# Patient Record
Sex: FEMALE | Race: WHITE | NOT HISPANIC OR LATINO | URBAN - METROPOLITAN AREA
[De-identification: names, ages, dates, MRNs, and addresses within clinical notes are randomized per-mention and may not be internally consistent; named-entity substitution may affect disease eponyms.]

---

## 2018-12-30 ENCOUNTER — EMERGENCY (EMERGENCY)
Facility: HOSPITAL | Age: 53
LOS: 1 days | Discharge: HOME | End: 2018-12-30
Attending: EMERGENCY MEDICINE | Admitting: INTERNAL MEDICINE
Payer: SELF-PAY

## 2018-12-30 VITALS
DIASTOLIC BLOOD PRESSURE: 113 MMHG | OXYGEN SATURATION: 100 % | RESPIRATION RATE: 16 BRPM | HEART RATE: 110 BPM | TEMPERATURE: 98 F | SYSTOLIC BLOOD PRESSURE: 198 MMHG

## 2018-12-30 DIAGNOSIS — I10 ESSENTIAL (PRIMARY) HYPERTENSION: ICD-10-CM

## 2018-12-30 DIAGNOSIS — R07.89 OTHER CHEST PAIN: ICD-10-CM

## 2018-12-30 LAB
ALBUMIN SERPL ELPH-MCNC: 4.4 G/DL — SIGNIFICANT CHANGE UP (ref 3.3–5)
ALP SERPL-CCNC: 77 U/L — SIGNIFICANT CHANGE UP (ref 40–120)
ALT FLD-CCNC: 23 U/L — SIGNIFICANT CHANGE UP (ref 4–33)
AST SERPL-CCNC: 23 U/L — SIGNIFICANT CHANGE UP (ref 4–32)
BASOPHILS # BLD AUTO: 0.06 K/UL — SIGNIFICANT CHANGE UP (ref 0–0.2)
BASOPHILS NFR BLD AUTO: 1 % — SIGNIFICANT CHANGE UP (ref 0–2)
BILIRUB SERPL-MCNC: < 0.2 MG/DL — LOW (ref 0.2–1.2)
BUN SERPL-MCNC: 11 MG/DL — SIGNIFICANT CHANGE UP (ref 7–23)
CALCIUM SERPL-MCNC: 9.3 MG/DL — SIGNIFICANT CHANGE UP (ref 8.4–10.5)
CHLORIDE SERPL-SCNC: 105 MMOL/L — SIGNIFICANT CHANGE UP (ref 98–107)
CK MB BLD-MCNC: 3.93 NG/ML — SIGNIFICANT CHANGE UP (ref 1–4.7)
CK MB BLD-MCNC: SIGNIFICANT CHANGE UP (ref 0–2.5)
CK SERPL-CCNC: 72 U/L — SIGNIFICANT CHANGE UP (ref 25–170)
CO2 SERPL-SCNC: 26 MMOL/L — SIGNIFICANT CHANGE UP (ref 22–31)
CREAT SERPL-MCNC: 0.67 MG/DL — SIGNIFICANT CHANGE UP (ref 0.5–1.3)
EOSINOPHIL # BLD AUTO: 0.21 K/UL — SIGNIFICANT CHANGE UP (ref 0–0.5)
EOSINOPHIL NFR BLD AUTO: 3.4 % — SIGNIFICANT CHANGE UP (ref 0–6)
GLUCOSE SERPL-MCNC: 111 MG/DL — HIGH (ref 70–99)
HCT VFR BLD CALC: 44.8 % — SIGNIFICANT CHANGE UP (ref 34.5–45)
HGB BLD-MCNC: 14.1 G/DL — SIGNIFICANT CHANGE UP (ref 11.5–15.5)
IMM GRANULOCYTES # BLD AUTO: 0.03 # — SIGNIFICANT CHANGE UP
IMM GRANULOCYTES NFR BLD AUTO: 0.5 % — SIGNIFICANT CHANGE UP (ref 0–1.5)
LYMPHOCYTES # BLD AUTO: 2.44 K/UL — SIGNIFICANT CHANGE UP (ref 1–3.3)
LYMPHOCYTES # BLD AUTO: 39.2 % — SIGNIFICANT CHANGE UP (ref 13–44)
MCHC RBC-ENTMCNC: 30.1 PG — SIGNIFICANT CHANGE UP (ref 27–34)
MCHC RBC-ENTMCNC: 31.5 % — LOW (ref 32–36)
MCV RBC AUTO: 95.5 FL — SIGNIFICANT CHANGE UP (ref 80–100)
MONOCYTES # BLD AUTO: 0.41 K/UL — SIGNIFICANT CHANGE UP (ref 0–0.9)
MONOCYTES NFR BLD AUTO: 6.6 % — SIGNIFICANT CHANGE UP (ref 2–14)
NEUTROPHILS # BLD AUTO: 3.08 K/UL — SIGNIFICANT CHANGE UP (ref 1.8–7.4)
NEUTROPHILS NFR BLD AUTO: 49.3 % — SIGNIFICANT CHANGE UP (ref 43–77)
NRBC # FLD: 0 — SIGNIFICANT CHANGE UP
PLATELET # BLD AUTO: 222 K/UL — SIGNIFICANT CHANGE UP (ref 150–400)
PMV BLD: 11 FL — SIGNIFICANT CHANGE UP (ref 7–13)
POTASSIUM SERPL-MCNC: 3.9 MMOL/L — SIGNIFICANT CHANGE UP (ref 3.5–5.3)
POTASSIUM SERPL-SCNC: 3.9 MMOL/L — SIGNIFICANT CHANGE UP (ref 3.5–5.3)
PROT SERPL-MCNC: 6.7 G/DL — SIGNIFICANT CHANGE UP (ref 6–8.3)
RBC # BLD: 4.69 M/UL — SIGNIFICANT CHANGE UP (ref 3.8–5.2)
RBC # FLD: 12.7 % — SIGNIFICANT CHANGE UP (ref 10.3–14.5)
SODIUM SERPL-SCNC: 142 MMOL/L — SIGNIFICANT CHANGE UP (ref 135–145)
TROPONIN T, HIGH SENSITIVITY: 17 NG/L — SIGNIFICANT CHANGE UP (ref ?–14)
TROPONIN T, HIGH SENSITIVITY: 20 NG/L — SIGNIFICANT CHANGE UP (ref ?–14)
TROPONIN T, HIGH SENSITIVITY: 23 NG/L — SIGNIFICANT CHANGE UP (ref ?–14)
WBC # BLD: 6.23 K/UL — SIGNIFICANT CHANGE UP (ref 3.8–10.5)
WBC # FLD AUTO: 6.23 K/UL — SIGNIFICANT CHANGE UP (ref 3.8–10.5)

## 2018-12-30 PROCEDURE — 93010 ELECTROCARDIOGRAM REPORT: CPT

## 2018-12-30 PROCEDURE — 99285 EMERGENCY DEPT VISIT HI MDM: CPT | Mod: 25

## 2018-12-30 RX ORDER — INFLUENZA VIRUS VACCINE 15; 15; 15; 15 UG/.5ML; UG/.5ML; UG/.5ML; UG/.5ML
0.5 SUSPENSION INTRAMUSCULAR ONCE
Qty: 0 | Refills: 0 | Status: DISCONTINUED | OUTPATIENT
Start: 2018-12-30 | End: 2018-12-31

## 2018-12-30 RX ORDER — LISINOPRIL 2.5 MG/1
2.5 TABLET ORAL DAILY
Qty: 0 | Refills: 0 | Status: DISCONTINUED | OUTPATIENT
Start: 2018-12-30 | End: 2018-12-31

## 2018-12-30 RX ORDER — SODIUM CHLORIDE 9 MG/ML
1000 INJECTION, SOLUTION INTRAVENOUS ONCE
Qty: 0 | Refills: 0 | Status: DISCONTINUED | OUTPATIENT
Start: 2018-12-30 | End: 2018-12-30

## 2018-12-30 RX ORDER — ASPIRIN/CALCIUM CARB/MAGNESIUM 324 MG
324 TABLET ORAL ONCE
Qty: 0 | Refills: 0 | Status: COMPLETED | OUTPATIENT
Start: 2018-12-30 | End: 2018-12-30

## 2018-12-30 RX ADMIN — LISINOPRIL 2.5 MILLIGRAM(S): 2.5 TABLET ORAL at 15:06

## 2018-12-30 RX ADMIN — Medication 324 MILLIGRAM(S): at 10:22

## 2018-12-30 NOTE — ED ADULT NURSE NOTE - NSIMPLEMENTINTERV_GEN_ALL_ED
Implemented All Universal Safety Interventions:  [place] to call system. Call bell, personal items and telephone within reach. Instruct patient to call for assistance. Room bathroom lighting operational. Non-slip footwear when patient is off stretcher. Physically safe environment: no spills, clutter or unnecessary equipment. Stretcher in lowest position, wheels locked, appropriate side rails in place.

## 2018-12-30 NOTE — ED ADULT NURSE NOTE - OBJECTIVE STATEMENT
pt a*o x3 c/o chest discomfort upon waking up this am 0230. states it feels like a heaviness. also endorses foot and hand tingling. dry mouth and generalized weakness. no motor defecit, facial droop, slurred speech noted. hx htn, states complaint with meds. right ac 20g placed. lsbs down and sent. md at bedside. nsr on monitor

## 2018-12-30 NOTE — ED PROVIDER NOTE - PHYSICAL EXAMINATION
Gen: NAD, AOx3  Head: NCAT  HEENT: PERRL, oral mucosa moist, normal conjunctiva  Lung: CTAB, no respiratory distress  CV: rrr, no murmurs, Normal perfusion  Abd: soft, NTND, no CVA tenderness  MSK: No edema, no visible deformities  Neuro: No focal neurologic deficits, CN intact, motor and sensation intact, no dysmetria, no pronator drift     Skin: No rash   Psych: normal affect

## 2018-12-30 NOTE — ED PROVIDER NOTE - OBJECTIVE STATEMENT
Patient is 53 y F with PMH htn presenting with episode of chest discomfort, feet/hand tingling, felt "off" and generally weak when getting up to go to the bathroom, feeling resolved on it's own.   on amlodipine once nightly, compliant  From [place], drove here 12/26, plans to return 1/1    PMD: Yesy Cardozo ([place])  ROS: Denies fever, palpitations, chills, recent sickness, HA, vision changes, cough, SOB, abdominal pain, n/v/d/c, dysuria, hematuria, rash, new joint aches, and sick contacts. Patient is 53 y F with PMH htn presenting with episode of chest discomfort, feet/hand tingling, felt "off" and generally weak when getting up to go to the bathroom, feeling resolved on its own.   on amlodipine once nightly, compliant  From [place], drove here 12/26, plans to return 1/1    PMD: Yesy Cardozo ([place])  ROS: Denies fever, palpitations, chills, recent sickness, HA, vision changes, cough, SOB, abdominal pain, n/v/d/c, dysuria, hematuria, rash, new joint aches, and sick contacts.

## 2018-12-30 NOTE — H&P ADULT - PROBLEM SELECTOR PLAN 1
admit to tele  serial EKG and Troponin to r/o ACS  Likely component of anxiety associated with Amlodipine  TTE ordered  Consider stress test to r/o CAD as cause of hypertension and multiple complaints

## 2018-12-30 NOTE — ED ADULT TRIAGE NOTE - CHIEF COMPLAINT QUOTE
Pt c/o chest discomfort "like something is stuck in her chest or putting pressure on her" which woke her from sleep. States HTN but "only takes pill once a day in the evening."

## 2018-12-30 NOTE — H&P ADULT - NSHPSOCIALHISTORY_GEN_ALL_CORE
Lives in [place] with daughter, works as a horse farmer  never smoked  has an occasional glass of wine  no illicit drug use

## 2018-12-30 NOTE — ED PROVIDER NOTE - PROGRESS NOTE DETAILS
trop increased by 6, will admit for acs r/o.  CDU full.  will admit to tele dod.  spoke with tele dod Dr. Hidalgo.  will notify tele pa.

## 2018-12-30 NOTE — H&P ADULT - HISTORY OF PRESENT ILLNESS
53 year old female pmh of HTN who presents with chest discomfort. Patient states she drove up from [place] on 12/26 and visiting her sister. She notes that woke up with chest discomfort described as a pressure/tightness, unable to grade on 1/10 scale, non-radiating, and intermittent in nature associated with palpitations.  She also notes having difficulty catching her breath during these episodes. Also notes general feeling of malaise and anxiety that is associated with use of Amlodipine for blood pressure. These symptoms all started after she was prescribed amlodipine, has never had anxiety prior to that.  Dnies headache, visual change, rash, cough, abd pain, nausea, vomiting, diarrhea, brbpr, melena, dysuria, hematuria, syncope, fall, head trauma.

## 2018-12-30 NOTE — H&P ADULT - NSHPLABSRESULTS_GEN_ALL_CORE
14.1   6.23  )-----------( 222      ( 30 Dec 2018 04:00 )             44.8     12-30    142  |  105  |  11  ----------------------------<  111<H>  3.9   |  26  |  0.67    Ca    9.3      30 Dec 2018 04:00    TPro  6.7  /  Alb  4.4  /  TBili  < 0.2<L>  /  DBili  x   /  AST  23  /  ALT  23  /  AlkPhos  77  12-30    EKG NSR @ 97 c poor r-wave progression  Trop 17 > 23  CXR Clear lungs

## 2018-12-30 NOTE — H&P ADULT - NSHPREVIEWOFSYSTEMS_GEN_ALL_CORE
Dnies headache, visual change, rash, cough, abd pain, nausea, vomiting, diarrhea, brbpr, melena, dysuria, hematuria, syncope, fall, head trauma.

## 2018-12-30 NOTE — ED PROVIDER NOTE - ATTENDING CONTRIBUTION TO CARE
52 y/o F with h/o HTN here with chest tightness.  Pt reports that she woke up around 2am, was walking to the bathroom when she started to feel a "burning" in her eyes, numbness to the roof of her mouth, and tingling to bilateral hands.  Then developed chest tightness and sensation of heart beating fast.  No fever, diaphoresis, SOB, nausea, abd pain, leg pain or swelling.  Pt is a nonsmoker.  No personal or family hx of cardiac disease.  Sxs lasting about 30 min and pt is currently asymptomatic.  Well appearing, sitting comfortably in stretcher, awake and alert, nontoxic.  VSS, hypertensive.  Lungs cta bl.  Cards nl S1/S2, RRR, no MRG.  Pulses full and equal in bilateral upper extremities.  Abd soft ntnd.  No pedal edema or calf tenderness.  No focal neuro deficits.  Plan for ekg, labs incl trop, cxr, reassess.  Consider acs in low risk pt (heart 2) vs metabolic disturbance vs anemia.  If trop neg, plan for outpatient follow-up.

## 2018-12-31 VITALS
SYSTOLIC BLOOD PRESSURE: 118 MMHG | TEMPERATURE: 98 F | OXYGEN SATURATION: 100 % | RESPIRATION RATE: 18 BRPM | DIASTOLIC BLOOD PRESSURE: 63 MMHG | HEART RATE: 81 BPM

## 2018-12-31 LAB
APPEARANCE UR: SIGNIFICANT CHANGE UP
BACTERIA # UR AUTO: NEGATIVE — SIGNIFICANT CHANGE UP
BILIRUB UR-MCNC: NEGATIVE — SIGNIFICANT CHANGE UP
BLOOD UR QL VISUAL: NEGATIVE — SIGNIFICANT CHANGE UP
BUN SERPL-MCNC: 11 MG/DL — SIGNIFICANT CHANGE UP (ref 7–23)
CALCIUM SERPL-MCNC: 9.6 MG/DL — SIGNIFICANT CHANGE UP (ref 8.4–10.5)
CHLORIDE SERPL-SCNC: 106 MMOL/L — SIGNIFICANT CHANGE UP (ref 98–107)
CHOLEST SERPL-MCNC: 170 MG/DL — SIGNIFICANT CHANGE UP (ref 120–199)
CO2 SERPL-SCNC: 22 MMOL/L — SIGNIFICANT CHANGE UP (ref 22–31)
COD CRY URNS QL: SIGNIFICANT CHANGE UP (ref 0–0)
COLOR SPEC: YELLOW — SIGNIFICANT CHANGE UP
CREAT SERPL-MCNC: 0.74 MG/DL — SIGNIFICANT CHANGE UP (ref 0.5–1.3)
GLUCOSE SERPL-MCNC: 95 MG/DL — SIGNIFICANT CHANGE UP (ref 70–99)
GLUCOSE UR-MCNC: NEGATIVE — SIGNIFICANT CHANGE UP
HBA1C BLD-MCNC: 5.2 % — SIGNIFICANT CHANGE UP (ref 4–5.6)
HCG SERPL-ACNC: < 5 MIU/ML — SIGNIFICANT CHANGE UP
HCT VFR BLD CALC: 41.9 % — SIGNIFICANT CHANGE UP (ref 34.5–45)
HDLC SERPL-MCNC: 58 MG/DL — SIGNIFICANT CHANGE UP (ref 45–65)
HGB BLD-MCNC: 13.6 G/DL — SIGNIFICANT CHANGE UP (ref 11.5–15.5)
KETONES UR-MCNC: NEGATIVE — SIGNIFICANT CHANGE UP
LEUKOCYTE ESTERASE UR-ACNC: NEGATIVE — SIGNIFICANT CHANGE UP
LIPID PNL WITH DIRECT LDL SERPL: 107 MG/DL — SIGNIFICANT CHANGE UP
MAGNESIUM SERPL-MCNC: 2 MG/DL — SIGNIFICANT CHANGE UP (ref 1.6–2.6)
MCHC RBC-ENTMCNC: 30.1 PG — SIGNIFICANT CHANGE UP (ref 27–34)
MCHC RBC-ENTMCNC: 32.5 % — SIGNIFICANT CHANGE UP (ref 32–36)
MCV RBC AUTO: 92.7 FL — SIGNIFICANT CHANGE UP (ref 80–100)
MUCOUS THREADS # UR AUTO: SIGNIFICANT CHANGE UP
NITRITE UR-MCNC: NEGATIVE — SIGNIFICANT CHANGE UP
NRBC # FLD: 0 — SIGNIFICANT CHANGE UP
PH UR: 6 — SIGNIFICANT CHANGE UP (ref 5–8)
PLATELET # BLD AUTO: 201 K/UL — SIGNIFICANT CHANGE UP (ref 150–400)
PMV BLD: 11 FL — SIGNIFICANT CHANGE UP (ref 7–13)
POTASSIUM SERPL-MCNC: 4.4 MMOL/L — SIGNIFICANT CHANGE UP (ref 3.5–5.3)
POTASSIUM SERPL-SCNC: 4.4 MMOL/L — SIGNIFICANT CHANGE UP (ref 3.5–5.3)
PROT UR-MCNC: 20 — SIGNIFICANT CHANGE UP
RBC # BLD: 4.52 M/UL — SIGNIFICANT CHANGE UP (ref 3.8–5.2)
RBC # FLD: 13 % — SIGNIFICANT CHANGE UP (ref 10.3–14.5)
RBC CASTS # UR COMP ASSIST: SIGNIFICANT CHANGE UP (ref 0–?)
SODIUM SERPL-SCNC: 143 MMOL/L — SIGNIFICANT CHANGE UP (ref 135–145)
SP GR SPEC: 1.03 — SIGNIFICANT CHANGE UP (ref 1–1.04)
SQUAMOUS # UR AUTO: SIGNIFICANT CHANGE UP
TRIGL SERPL-MCNC: 86 MG/DL — SIGNIFICANT CHANGE UP (ref 10–149)
UROBILINOGEN FLD QL: NORMAL — SIGNIFICANT CHANGE UP
WBC # BLD: 5.06 K/UL — SIGNIFICANT CHANGE UP (ref 3.8–10.5)
WBC # FLD AUTO: 5.06 K/UL — SIGNIFICANT CHANGE UP (ref 3.8–10.5)
WBC UR QL: SIGNIFICANT CHANGE UP (ref 0–?)

## 2018-12-31 PROCEDURE — 93016 CV STRESS TEST SUPVJ ONLY: CPT | Mod: GC

## 2018-12-31 PROCEDURE — 93306 TTE W/DOPPLER COMPLETE: CPT | Mod: 26

## 2018-12-31 PROCEDURE — 93018 CV STRESS TEST I&R ONLY: CPT | Mod: GC

## 2018-12-31 RX ORDER — LISINOPRIL 2.5 MG/1
1 TABLET ORAL
Qty: 30 | Refills: 0 | OUTPATIENT
Start: 2018-12-31 | End: 2019-01-29

## 2018-12-31 RX ADMIN — LISINOPRIL 2.5 MILLIGRAM(S): 2.5 TABLET ORAL at 06:47

## 2018-12-31 NOTE — DISCHARGE NOTE ADULT - CARE PROVIDERS DIRECT ADDRESSES
[email or web address] ,[email or web address]

## 2018-12-31 NOTE — DISCHARGE NOTE ADULT - MEDICATION SUMMARY - MEDICATIONS TO TAKE
I will START or STAY ON the medications listed below when I get home from the hospital:    lisinopril 2.5 mg oral tablet  -- 1 tab(s) by mouth once a day  -- Indication: For htn

## 2018-12-31 NOTE — DISCHARGE NOTE ADULT - CARE PLAN
Principal Discharge DX:	Chest tightness  Secondary Diagnosis:	Essential hypertension Principal Discharge DX:	Chest tightness  Goal:	screen for heart attack  Assessment and plan of treatment:	Your echocardiogram and walking stress is unremarkable. please follow up with your provider at [place] in 2 weeks  Secondary Diagnosis:	Essential hypertension  Goal:	To maintain a normal blood pressure to prevent heart attack, stroke and renal failure.  Assessment and plan of treatment:	Low sodium and fat diet, continue anti-hypertensive medications, and follow up with primary care physician.

## 2018-12-31 NOTE — DISCHARGE NOTE ADULT - PLAN OF CARE
screen for heart attack Your echocardiogram and walking stress is unremarkable. please follow up with your provider at [place] in 2 weeks To maintain a normal blood pressure to prevent heart attack, stroke and renal failure. Low sodium and fat diet, continue anti-hypertensive medications, and follow up with primary care physician.

## 2018-12-31 NOTE — DISCHARGE NOTE ADULT - CARE PROVIDER_API CALL
Draio Hidalgo (MD; PhD), Cardiology; Internal Medicine; Vascular Medicine  [address] [place] [address] [address] [place] [address]  Phone: [phone]  Fax: [phone] Dr Yesy Knox,   PCP  Phone: (   )    -  Fax: (   )    -

## 2018-12-31 NOTE — DISCHARGE NOTE ADULT - PATIENT PORTAL LINK FT
You can access the [facility], offered by [facility], by registering with the following website: [email or web address]

## 2018-12-31 NOTE — DISCHARGE NOTE ADULT - HOSPITAL COURSE
53 year old female pmh of HTN who presents with chest discomfort. Patient states she drove up from [place] on 12/26 and visiting her sister. She notes that woke up with chest discomfort described as a pressure/tightness, unable to grade on 1/10 scale, non-radiating, and intermittent in nature associated with palpitations.  She also notes having difficulty catching her breath during these episodes. Also notes general feeling of malaise and anxiety that is associated with use of Amlodipine for blood pressure. These symptoms all started after she was prescribed amlodipine, has never had anxiety prior to that.  Dnies headache, visual change, rash, cough, abd pain, nausea, vomiting, diarrhea, brbpr, melena, dysuria, hematuria, syncope, fall, head trauma.     Hospital course:  EKG NSR @ 97 c poor r-wave progression  Trop 17 > 23  CXR Clear lungs  TTE:   NST: 53 year old female pmh of HTN who presents with chest discomfort. Patient states she drove up from [place] on 12/26 and visiting her sister. She notes that woke up with chest discomfort described as a pressure/tightness, unable to grade on 1/10 scale, non-radiating, and intermittent in nature associated with palpitations.  She also notes having difficulty catching her breath during these episodes. Also notes general feeling of malaise and anxiety that is associated with use of Amlodipine for blood pressure. These symptoms all started after she was prescribed amlodipine, has never had anxiety prior to that.  Dnies headache, visual change, rash, cough, abd pain, nausea, vomiting, diarrhea, brbpr, melena, dysuria, hematuria, syncope, fall, head trauma.     Hospital course:  EKG NSR @ 97 c poor r-wave progression  Trop 17 > 23  CXR Clear lungs  12/31 TTE: 70  Normal mitral valve. Mild mitral regurgitation. Normal trileaflet aortic valve. Minimal aortic regurgitation. Normal left ventricular internal dimensions and wall thicknesses. Normal left ventricular systolic function. No segmental wall motion abnormalities. LVEF 70%. Normal right ventricular size and function. Normal tricuspid valve. Minimal tricuspid regurgitation. Estimated pulmonary artery systolic pressure equals 25mm Hg, assuming right atrial pressure equals 10  mm Hg,consistent with normal pulmonary pressures.*** No previous Echo exam.  12/31 stress: no ST change     Pt presented with CP, CE indeterminant. TTE/NST unremarkable. As per Dr Hidalgo 53 year old female pmh of HTN who presents with chest discomfort. Patient states she drove up from [place] on 12/26 and visiting her sister. She notes that woke up with chest discomfort described as a pressure/tightness, unable to grade on 1/10 scale, non-radiating, and intermittent in nature associated with palpitations.  She also notes having difficulty catching her breath during these episodes. Also notes general feeling of malaise and anxiety that is associated with use of Amlodipine for blood pressure. These symptoms all started after she was prescribed amlodipine, has never had anxiety prior to that.  Dnies headache, visual change, rash, cough, abd pain, nausea, vomiting, diarrhea, brbpr, melena, dysuria, hematuria, syncope, fall, head trauma.     Hospital course:  EKG NSR @ 97 c poor r-wave progression  Trop 17 > 23  CXR Clear lungs  12/31 TTE: 70  Normal mitral valve. Mild mitral regurgitation. Normal trileaflet aortic valve. Minimal aortic regurgitation. Normal left ventricular internal dimensions and wall thicknesses. Normal left ventricular systolic function. No segmental wall motion abnormalities. LVEF 70%. Normal right ventricular size and function. Normal tricuspid valve. Minimal tricuspid regurgitation. Estimated pulmonary artery systolic pressure equals 25mm Hg, assuming right atrial pressure equals 10  mm Hg,consistent with normal pulmonary pressures.*** No previous Echo exam.  12/31 stress: no ST change     Pt presented with CP, CE indeterminant. TTE/NST unremarkable. As per Dr Hidalgo pt cleared for discharge on 12/31

## 2019-01-01 LAB
BACTERIA UR CULT: SIGNIFICANT CHANGE UP
SPECIMEN SOURCE: SIGNIFICANT CHANGE UP

## 2019-01-03 RX ORDER — AMLODIPINE BESYLATE 2.5 MG/1
1 TABLET ORAL
Qty: 0 | Refills: 0 | COMMUNITY

## 2023-01-17 NOTE — PATIENT PROFILE ADULT - HAVE YOU EXPERIENCED A TRAUMATIC EVENT?
Please see updated progress note for patient Jani Cha, MRN [id number] for plan of care. Let me know if you have any additional questions  Thank you, Laura Webber PT, DPT no
